# Patient Record
Sex: FEMALE | Race: WHITE | Employment: FULL TIME | URBAN - METROPOLITAN AREA
[De-identification: names, ages, dates, MRNs, and addresses within clinical notes are randomized per-mention and may not be internally consistent; named-entity substitution may affect disease eponyms.]

---

## 2019-09-10 ENCOUNTER — HOSPITAL ENCOUNTER (OUTPATIENT)
Dept: MRI IMAGING | Age: 29
Discharge: HOME | End: 2019-09-10
Attending: INTERNAL MEDICINE
Payer: COMMERCIAL

## 2019-09-10 VITALS — WEIGHT: 140 LBS

## 2019-09-10 DIAGNOSIS — E22.1 IDIOPATHIC HYPERPROLACTINEMIA (HCC): ICD-10-CM

## 2019-09-10 PROCEDURE — 74011250636 HC RX REV CODE- 250/636: Performed by: INTERNAL MEDICINE

## 2019-09-10 PROCEDURE — 70553 MRI BRAIN STEM W/O & W/DYE: CPT

## 2019-09-10 PROCEDURE — A9575 INJ GADOTERATE MEGLUMI 0.1ML: HCPCS | Performed by: INTERNAL MEDICINE

## 2019-09-10 RX ORDER — GADOTERATE MEGLUMINE 376.9 MG/ML
13 INJECTION INTRAVENOUS ONCE
Status: COMPLETED | OUTPATIENT
Start: 2019-09-10 | End: 2019-09-10

## 2019-09-10 RX ADMIN — GADOTERATE MEGLUMINE 13 ML: 376.9 INJECTION INTRAVENOUS at 17:59

## 2025-01-24 ENCOUNTER — OFFICE VISIT (OUTPATIENT)
Age: 35
End: 2025-01-24

## 2025-01-24 VITALS
SYSTOLIC BLOOD PRESSURE: 104 MMHG | WEIGHT: 135.2 LBS | DIASTOLIC BLOOD PRESSURE: 66 MMHG | RESPIRATION RATE: 16 BRPM | BODY MASS INDEX: 21.22 KG/M2 | HEART RATE: 72 BPM | TEMPERATURE: 98.8 F | OXYGEN SATURATION: 97 % | HEIGHT: 67 IN

## 2025-01-24 DIAGNOSIS — R30.0 DYSURIA: Primary | ICD-10-CM

## 2025-01-24 LAB
BILIRUBIN, URINE, POC: NEGATIVE
BLOOD URINE, POC: NORMAL
GLUCOSE URINE, POC: NEGATIVE
KETONES, URINE, POC: NEGATIVE
LEUKOCYTE ESTERASE, URINE, POC: NEGATIVE
NITRITE, URINE, POC: NEGATIVE
PH, URINE, POC: 6 (ref 4.6–8)
PROTEIN,URINE, POC: NEGATIVE
SPECIFIC GRAVITY, URINE, POC: 1.03 (ref 1–1.03)
URINALYSIS CLARITY, POC: CLEAR
URINALYSIS COLOR, POC: YELLOW
UROBILINOGEN, POC: NORMAL MG/DL

## 2025-01-24 RX ORDER — CABERGOLINE 0.5 MG/1
TABLET ORAL
COMMUNITY

## 2025-01-24 RX ORDER — PROPRANOLOL HCL 20 MG
TABLET ORAL
COMMUNITY
Start: 2024-12-20

## 2025-01-24 NOTE — PROGRESS NOTES
Sandra Seals (:  1990) is a 34 y.o. female,New patient, here for evaluation of the following chief complaint(s):  Other (Pt presents to clinic w/ c/o nausea, lower abdominal pain/pressure, abdominal bloating, chills, hot sweats, lower back pain, urinary urgency, loss of appetite and blood in urine. Pt reports sx onset of 25. Denies use of OTC. Pt reports she spoke to a telehealth provider who stated she may have a bladder or kidney infection) and Care Coordination        TRIAGE VISIT ONLY        SUBJECTIVE/OBJECTIVE:    History provided by:  Patient       34 y.o. female presents with symptoms of nausea, chills and sweats that started two days ago.  No fevers.  Then started having pelvic pain and pressure and bloating, while waiting in the waiting room pain became sharp.  Admits to fatigue and malaise and loss of appetite.  Admits to hematuria.  Admits to urinary urgency.  Admits to low back pain.  No flank pain.  LMP a week ago.  No vaginal bleeding.    She states prior to this visit she did a telehealth visit and was told to come to urgent care as she might have a kidney infection.    History of recent dental procedure.         Vitals:    25 1443   BP: 104/66   Site: Right Upper Arm   Position: Sitting   Cuff Size: Medium Adult   Pulse: 72   Resp: 16   Temp: 98.8 °F (37.1 °C)   TempSrc: Oral   SpO2: 97%   Weight: 61.3 kg (135 lb 3.2 oz)   Height: 1.702 m (5' 7\")       Results for orders placed or performed in visit on 25   AMB POC URINALYSIS DIP STICK AUTO W/ MICRO   Result Value Ref Range    Color (UA POC) Yellow     Clarity (UA POC) Clear     Glucose, Urine, POC Negative     Bilirubin, Urine, POC Negative     Ketones, Urine, POC Negative     Specific Gravity, Urine, POC 1.030 1.001 - 1.035    Blood (UA POC) Small     pH, Urine, POC 6.0 4.6 - 8.0    Protein, Urine, POC Negative     Urobilinogen, POC 0.2 mg/dL <1.1 mg/dL    Nitrite, Urine, POC Negative Negative    Leukocyte Esterase,

## 2025-07-11 ENCOUNTER — TRANSCRIBE ORDERS (OUTPATIENT)
Facility: HOSPITAL | Age: 35
End: 2025-07-11

## 2025-07-11 DIAGNOSIS — E22.1 HYPERPROLACTINEMIA: Primary | ICD-10-CM
